# Patient Record
Sex: MALE | Race: WHITE | URBAN - METROPOLITAN AREA
[De-identification: names, ages, dates, MRNs, and addresses within clinical notes are randomized per-mention and may not be internally consistent; named-entity substitution may affect disease eponyms.]

---

## 2018-02-08 ENCOUNTER — OFFICE VISIT (OUTPATIENT)
Dept: FAMILY MEDICINE | Facility: CLINIC | Age: 23
End: 2018-02-08
Payer: COMMERCIAL

## 2018-02-08 VITALS
WEIGHT: 210 LBS | HEIGHT: 73 IN | SYSTOLIC BLOOD PRESSURE: 120 MMHG | BODY MASS INDEX: 27.83 KG/M2 | DIASTOLIC BLOOD PRESSURE: 80 MMHG | HEART RATE: 60 BPM

## 2018-02-08 DIAGNOSIS — M75.41 IMPINGEMENT SYNDROME OF SHOULDER REGION, RIGHT: Primary | ICD-10-CM

## 2018-02-08 RX ORDER — METHYLPREDNISOLONE 4 MG
8 TABLET, DOSE PACK ORAL SEE ADMIN INSTRUCTIONS
Qty: 21 TABLET | Refills: 0 | Status: SHIPPED | OUTPATIENT
Start: 2018-02-08

## 2018-02-08 NOTE — MR AVS SNAPSHOT
"              After Visit Summary   2018    Yeison Ricardo    MRN: [id number]           Patient Information     Date Of Birth          1995        Visit Information        Provider Department      2018 4:30 PM Jean Claude Carlos MD [facility]        Today's Diagnoses     Impingement syndrome of shoulder region, right    -  1       Follow-ups after your visit        Who to contact     Please call your clinic at [phone] to:    Ask questions about your health    Make or cancel appointments    Discuss your medicines    Learn about your test results    Speak to your doctor            Additional Information About Your Visit        MyChart Information     [employer] is an electronic gateway that provides easy, online access to your medical records. With [employer], you can request a clinic appointment, read your test results, renew a prescription or communicate with your care team.     To sign up for [employer] visit the website at [email or web address]   You will be asked to enter the access code listed below, as well as some personal information. Please follow the directions to create your username and password.     Your access code is: [email or web address]  Expires: 5/10/2018  6:30 AM     Your access code will  in 90 days. If you need help or a new code, please contact your [facility] or call [phone] for assistance.        Care EveryWhere ID     This is your Care EveryWhere ID. This could be used by other organizations to access your [place] medical records  [id number]        Your Vitals Were     Pulse Height BMI (Body Mass Index)             60 1.854 m (6' 1\") 27.71 kg/m2          Blood Pressure from Last 3 Encounters:   18 120/80   14 128/81    Weight from Last 3 Encounters:   18 95.3 kg (210 lb)   14 91.6 kg (202 lb) (94 %)*     * Growth percentiles are based on CDC 2-20 Years data.                 Today's Medication " Changes          These changes are accurate as of 2/8/18 11:59 PM.  If you have any questions, ask your nurse or doctor.               Start taking these medicines.        Dose/Directions    methylPREDNISolone 4 MG tablet   Commonly known as:  MEDROL DOSEPAK   Used for:  Impingement syndrome of shoulder region, right        Dose:  8 mg   Take 2 tablets (8 mg) by mouth See Admin Instructions follow package directions   Quantity:  21 tablet   Refills:  0            Where to get your medicines      These medications were sent to [id number] [facility] - [place], [place] - [address]  [address] , [facility] [address]     Phone:  [phone]     methylPREDNISolone 4 MG tablet                Primary Care Provider    None Specified       No primary provider on file.        Equal Access to Services     [facility] : Malorie Kaye, tina moreno, narda kwok, julius morley. So [facility] [phone].    ATENCIÓN: Si habla español, tiene a segura disposición servicios gratuitos de asistencia lingüística. Llame al [phone].    We comply with applicable federal civil rights laws and [place] laws. We do not discriminate on the basis of race, color, national origin, age, disability, sex, sexual orientation, or gender identity.            Thank you!     Thank you for choosing [facility]  for your care. Our goal is always to provide you with excellent care. Hearing back from our patients is one way we can continue to improve our services. Please take a few minutes to complete the written survey that you may receive in the mail after your visit with us. Thank you!             Your Updated Medication List - Protect others around you: Learn how to safely use, store and throw away your medicines at www.disposemymeds.org.          This list is accurate as of 2/8/18 11:59 PM.  Always use your most recent med list.                   Brand Name  Dispense Instructions for use Diagnosis    methylPREDNISolone 4 MG tablet    MEDROL DOSEPAK    21 tablet    Take 2 tablets (8 mg) by mouth See Admin Instructions follow package directions    Impingement syndrome of shoulder region, right

## 2018-02-08 NOTE — LETTER
Date:February 16, 2018      Patient was self referred, no letter generated. Do not send.        [facility] Information

## 2018-02-08 NOTE — LETTER
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  Had not thrown much prior to getting back to practices.  No one time injury.  Has lost velocity.    Review of Systems   Constitutional: Negative.    Respiratory: Negative.    Cardiovascular: Negative.    Musculoskeletal: Positive for myalgias. Negative for joint pain and neck pain.   Skin: Negative.    Psychiatric/Behavioral: Negative.          Physical Exam   Constitutional: He is oriented to person, place, and time and well-developed, well-nourished, and in no distress. No distress.   Neurological: He is alert and oriented to person, place, and time.   Skin: He is not diaphoretic.   Psychiatric: Memory and affect normal.   Mild pos neer's/caceres  Neg Boyd    Pitcher with shoulder pain.    Impingement and overuse with return to throwing vs cuff/labral injury    -MRI to assess further  -will discuss further plan once imaging is done    Jean Claude Carlos MD

## 2018-02-09 ENCOUNTER — RADIANT APPOINTMENT (OUTPATIENT)
Dept: MRI IMAGING | Facility: CLINIC | Age: 23
End: 2018-02-09
Attending: FAMILY MEDICINE
Payer: COMMERCIAL

## 2018-02-09 DIAGNOSIS — M75.41 IMPINGEMENT SYNDROME OF SHOULDER REGION, RIGHT: ICD-10-CM

## 2018-02-15 ASSESSMENT — ENCOUNTER SYMPTOMS
CARDIOVASCULAR NEGATIVE: 1
PSYCHIATRIC NEGATIVE: 1
RESPIRATORY NEGATIVE: 1
NECK PAIN: 0
CONSTITUTIONAL NEGATIVE: 1
MYALGIAS: 1

## 2018-02-15 NOTE — PROGRESS NOTES
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  Had not thrown much prior to getting back to practices.  No one time injury.  Has lost velocity.    Review of Systems   Constitutional: Negative.    Respiratory: Negative.    Cardiovascular: Negative.    Musculoskeletal: Positive for myalgias. Negative for joint pain and neck pain.   Skin: Negative.    Psychiatric/Behavioral: Negative.          Physical Exam   Constitutional: He is oriented to person, place, and time and well-developed, well-nourished, and in no distress. No distress.   Neurological: He is alert and oriented to person, place, and time.   Skin: He is not diaphoretic.   Psychiatric: Memory and affect normal.   Mild pos neer's/caceres  Neg Boyd    Pitcher with shoulder pain.    Impingement and overuse with return to throwing vs cuff/labral injury    -MRI to assess further  -will discuss further plan once imaging is done